# Patient Record
Sex: FEMALE | Race: WHITE | Employment: PART TIME | URBAN - NONMETROPOLITAN AREA
[De-identification: names, ages, dates, MRNs, and addresses within clinical notes are randomized per-mention and may not be internally consistent; named-entity substitution may affect disease eponyms.]

---

## 2019-03-25 ENCOUNTER — HOSPITAL ENCOUNTER (EMERGENCY)
Age: 24
Discharge: ANOTHER HOSPITAL | End: 2019-03-25
Payer: COMMERCIAL

## 2019-03-25 VITALS
BODY MASS INDEX: 28.41 KG/M2 | WEIGHT: 176 LBS | HEART RATE: 102 BPM | TEMPERATURE: 98.4 F | DIASTOLIC BLOOD PRESSURE: 67 MMHG | OXYGEN SATURATION: 96 % | SYSTOLIC BLOOD PRESSURE: 108 MMHG | RESPIRATION RATE: 16 BRPM

## 2019-03-25 DIAGNOSIS — S76.212A GROIN STRAIN, LEFT, INITIAL ENCOUNTER: ICD-10-CM

## 2019-03-25 DIAGNOSIS — R56.9 SEIZURE (HCC): Primary | ICD-10-CM

## 2019-03-25 LAB — GLUCOSE BLD-MCNC: 110 MG/DL (ref 70–108)

## 2019-03-25 PROCEDURE — 36000 PLACE NEEDLE IN VEIN: CPT

## 2019-03-25 PROCEDURE — 99215 OFFICE O/P EST HI 40 MIN: CPT

## 2019-03-25 PROCEDURE — 99203 OFFICE O/P NEW LOW 30 MIN: CPT | Performed by: NURSE PRACTITIONER

## 2019-03-25 PROCEDURE — 2709999900 HC NON-CHARGEABLE SUPPLY

## 2019-03-25 PROCEDURE — 82948 REAGENT STRIP/BLOOD GLUCOSE: CPT

## 2019-03-25 RX ORDER — OXCARBAZEPINE 300 MG/1
300 TABLET, FILM COATED ORAL 2 TIMES DAILY
COMMUNITY

## 2019-03-25 RX ORDER — IBUPROFEN 200 MG
200 TABLET ORAL EVERY 6 HOURS PRN
COMMUNITY

## 2019-03-25 ASSESSMENT — ENCOUNTER SYMPTOMS
RHINORRHEA: 0
SINUS PRESSURE: 0
PHOTOPHOBIA: 0
VOICE CHANGE: 0
ABDOMINAL PAIN: 0
SORE THROAT: 0
CHOKING: 0
STRIDOR: 0
VOMITING: 0
CHEST TIGHTNESS: 0
COUGH: 0
WHEEZING: 0
SINUS PAIN: 0
SHORTNESS OF BREATH: 0
NAUSEA: 1
TROUBLE SWALLOWING: 0

## 2019-03-25 ASSESSMENT — PAIN DESCRIPTION - PAIN TYPE: TYPE: ACUTE PAIN

## 2019-03-25 ASSESSMENT — PAIN DESCRIPTION - DESCRIPTORS: DESCRIPTORS: ACHING

## 2019-03-25 ASSESSMENT — PAIN SCALES - GENERAL: PAINLEVEL_OUTOF10: 6

## 2019-03-25 ASSESSMENT — PAIN DESCRIPTION - ONSET: ONSET: SUDDEN

## 2019-03-25 ASSESSMENT — PAIN DESCRIPTION - PROGRESSION: CLINICAL_PROGRESSION: NOT CHANGED

## 2019-03-25 ASSESSMENT — PAIN DESCRIPTION - FREQUENCY: FREQUENCY: CONTINUOUS

## 2019-03-25 ASSESSMENT — PAIN DESCRIPTION - ORIENTATION: ORIENTATION: LEFT

## 2019-03-25 ASSESSMENT — PAIN - FUNCTIONAL ASSESSMENT: PAIN_FUNCTIONAL_ASSESSMENT: PREVENTS OR INTERFERES SOME ACTIVE ACTIVITIES AND ADLS

## 2019-03-25 ASSESSMENT — PAIN DESCRIPTION - LOCATION: LOCATION: GROIN

## 2020-07-31 ENCOUNTER — HOSPITAL ENCOUNTER (EMERGENCY)
Age: 25
Discharge: HOME | End: 2020-07-31
Payer: COMMERCIAL

## 2020-07-31 VITALS
BODY MASS INDEX: 25.55 KG/M2 | WEIGHT: 159 LBS | RESPIRATION RATE: 16 BRPM | HEART RATE: 85 BPM | DIASTOLIC BLOOD PRESSURE: 71 MMHG | TEMPERATURE: 97.4 F | OXYGEN SATURATION: 97 % | HEIGHT: 66 IN | SYSTOLIC BLOOD PRESSURE: 136 MMHG

## 2020-07-31 LAB
GROUP A STREP CULTURE, REFLEX: NEGATIVE
REFLEX THROAT C + S: NORMAL

## 2020-07-31 PROCEDURE — 87880 STREP A ASSAY W/OPTIC: CPT

## 2020-07-31 PROCEDURE — 99213 OFFICE O/P EST LOW 20 MIN: CPT

## 2020-07-31 PROCEDURE — 99213 OFFICE O/P EST LOW 20 MIN: CPT | Performed by: NURSE PRACTITIONER

## 2020-07-31 PROCEDURE — 87070 CULTURE OTHR SPECIMN AEROBIC: CPT

## 2020-07-31 RX ORDER — CEFDINIR 300 MG/1
300 CAPSULE ORAL 2 TIMES DAILY
Qty: 20 CAPSULE | Refills: 0 | Status: SHIPPED | OUTPATIENT
Start: 2020-07-31 | End: 2020-08-10

## 2020-07-31 ASSESSMENT — PAIN DESCRIPTION - LOCATION: LOCATION: THROAT

## 2020-07-31 ASSESSMENT — PAIN - FUNCTIONAL ASSESSMENT: PAIN_FUNCTIONAL_ASSESSMENT: PREVENTS OR INTERFERES SOME ACTIVE ACTIVITIES AND ADLS

## 2020-07-31 ASSESSMENT — PAIN DESCRIPTION - PAIN TYPE: TYPE: ACUTE PAIN

## 2020-07-31 ASSESSMENT — ENCOUNTER SYMPTOMS
NAUSEA: 0
SHORTNESS OF BREATH: 0
VOMITING: 0
SORE THROAT: 1
COUGH: 0

## 2020-07-31 ASSESSMENT — PAIN DESCRIPTION - DESCRIPTORS: DESCRIPTORS: DISCOMFORT

## 2020-07-31 ASSESSMENT — PAIN SCALES - GENERAL: PAINLEVEL_OUTOF10: 8

## 2020-07-31 NOTE — ED TRIAGE NOTES
Patient ambulated to room with complaint of sore throat that radiates to ears.  States pain started 3 days ago and rates pain 8/10

## 2020-07-31 NOTE — ED PROVIDER NOTES
[facility]  Urgent Care Encounter       CHIEF COMPLAINT       Chief Complaint   Patient presents with    Pharyngitis       Nurses Notes reviewed and I agree except as noted in the HPI. HISTORY OF PRESENT ILLNESS   Jo is a 30 y.o. female who presents for evaluation of sore throat that has been ongoing for the past 3 days. Patient states that the pain is now beginning to radiate into her ears. She denies any fever or chills that she is aware of. She states that she has been taking Tylenol at home without much relief. She denies any cough, congestion, runny nose or any other upper respiratory complaints. She denies any known sick exposures. The history is provided by the patient. REVIEW OF SYSTEMS     Review of Systems   Constitutional: Negative for chills and fever. HENT: Positive for ear pain and sore throat. Negative for congestion. Respiratory: Negative for cough and shortness of breath. Cardiovascular: Negative for chest pain. Gastrointestinal: Negative for nausea and vomiting. Musculoskeletal: Negative for arthralgias and myalgias. Skin: Negative for rash. Allergic/Immunologic: Negative for immunocompromised state. Neurological: Negative for headaches. PAST MEDICAL HISTORY         Diagnosis Date    Seizure disorder [facility])     epilepsy       SURGICALHISTORY     Patient  has no past surgical history on file. CURRENT MEDICATIONS       Previous Medications    IBUPROFEN (ADVIL;MOTRIN) 200 MG TABLET    Take 200 mg by mouth every 6 hours as needed for Pain    OXCARBAZEPINE (TRILEPTAL) 300 MG TABLET    Take 300 mg by mouth 2 times daily    PSEUDOEPHEDRINE-APAP-DM (DAYQUIL PO)    Take by mouth       ALLERGIES     Patient is is allergic to pcn [penicillins]. Patients   There is no immunization history on file for this patient. FAMILY HISTORY     Patient's family history is not on file.     SOCIAL HISTORY     Patient  reports that she DIAGNOSTIC RESULTS     Labs:No results found for this visit on 07/31/20. IMAGING:    No orders to display         EKG:  none    URGENT CARE COURSE:     Vitals:    07/31/20 1650   BP: 136/71   Pulse: 85   Resp: 16   Temp: 97.4 °F (36.3 °C)   TempSrc: Temporal   SpO2: 97%   Weight: 159 lb (72.1 kg)   Height: 5' 6\" (1.676 m)       Medications - No data to display         PROCEDURES:  None    FINAL IMPRESSION      1. Exudative tonsillitis          DISPOSITION/ PLAN       Strep swab is negative at this time, however based on physical exam findings we will plan to treat with oral Omnicef and the patient is advised to continue to remain hydrated and use over-the-counter Tylenol and ibuprofen as needed. She is also given a prescription for Magic mouthwash for pain relief and is advised to follow-up on an outpatient basis if her symptoms do not improve or seem to worsen. She is agreeable to plan as discussed. PATIENT REFERRED TO:  No primary care provider on file. No primary physician on file.       DISCHARGE MEDICATIONS:  New Prescriptions    CEFDINIR (OMNICEF) 300 MG CAPSULE    Take 1 capsule by mouth 2 times daily for 10 days    MAGIC MOUTHWASH (MIRACLE MOUTHWASH)    Swish and spit 5 mLs 4 times daily as needed for Irritation       Discontinued Medications    No medications on file       Current Discharge Medication List          OSIEL Sigala CNP    (Please note that portions of this note were completed with a voice recognition program. Efforts were made to edit the dictations but occasionally words are mis-transcribed.)          OSIEL Sigala CNP  07/31/20 7848

## 2020-07-31 NOTE — ED NOTES
All discharge instructions and medications reviewed with pt at discharge. Instructed pt to go directly to main er if experiencing shortness of breath, chest pain, abdominal pain or if symptoms worsen. Pt verbalizes understanding. Ambulatory to lobby in stable condition.       Buddy Dale RN  07/31/20 0942

## 2020-08-02 LAB — THROAT/NOSE CULTURE: NORMAL

## 2023-09-16 ENCOUNTER — HOSPITAL ENCOUNTER (EMERGENCY)
Age: 28
Discharge: HOME | End: 2023-09-16

## 2023-09-16 VITALS
DIASTOLIC BLOOD PRESSURE: 87 MMHG | WEIGHT: 160 LBS | HEART RATE: 81 BPM | SYSTOLIC BLOOD PRESSURE: 118 MMHG | RESPIRATION RATE: 22 BRPM | TEMPERATURE: 98.6 F | BODY MASS INDEX: 25.71 KG/M2 | OXYGEN SATURATION: 99 % | HEIGHT: 66 IN

## 2023-09-16 DIAGNOSIS — K04.01 ACUTE PULPITIS: Primary | ICD-10-CM

## 2023-09-16 PROCEDURE — 99284 EMERGENCY DEPT VISIT MOD MDM: CPT

## 2023-09-16 PROCEDURE — 6360000002 HC RX W HCPCS: Performed by: NURSE PRACTITIONER

## 2023-09-16 PROCEDURE — 96372 THER/PROPH/DIAG INJ SC/IM: CPT

## 2023-09-16 PROCEDURE — 6370000000 HC RX 637 (ALT 250 FOR IP): Performed by: NURSE PRACTITIONER

## 2023-09-16 RX ORDER — CLINDAMYCIN HYDROCHLORIDE 150 MG/1
300 CAPSULE ORAL ONCE
Status: COMPLETED | OUTPATIENT
Start: 2023-09-16 | End: 2023-09-16

## 2023-09-16 RX ORDER — CLINDAMYCIN HYDROCHLORIDE 300 MG/1
300 CAPSULE ORAL 4 TIMES DAILY
Qty: 28 CAPSULE | Refills: 0 | Status: SHIPPED | OUTPATIENT
Start: 2023-09-16 | End: 2023-09-23

## 2023-09-16 RX ORDER — KETOROLAC TROMETHAMINE 30 MG/ML
30 INJECTION, SOLUTION INTRAMUSCULAR; INTRAVENOUS ONCE
Status: COMPLETED | OUTPATIENT
Start: 2023-09-16 | End: 2023-09-16

## 2023-09-16 RX ORDER — NAPROXEN 500 MG/1
500 TABLET ORAL 2 TIMES DAILY WITH MEALS
Qty: 30 TABLET | Refills: 0 | Status: SHIPPED | OUTPATIENT
Start: 2023-09-16 | End: 2023-10-01

## 2023-09-16 RX ADMIN — CLINDAMYCIN HYDROCHLORIDE 300 MG: 150 CAPSULE ORAL at 07:00

## 2023-09-16 RX ADMIN — Medication 5 ML: at 07:00

## 2023-09-16 RX ADMIN — KETOROLAC TROMETHAMINE 30 MG: 30 INJECTION, SOLUTION INTRAMUSCULAR at 07:00

## 2023-09-16 ASSESSMENT — PAIN - FUNCTIONAL ASSESSMENT: PAIN_FUNCTIONAL_ASSESSMENT: 0-10

## 2023-09-16 ASSESSMENT — PAIN SCALES - GENERAL: PAINLEVEL_OUTOF10: 9

## 2023-09-16 ASSESSMENT — PAIN DESCRIPTION - ONSET: ONSET: ON-GOING

## 2023-09-16 ASSESSMENT — PAIN DESCRIPTION - ORIENTATION: ORIENTATION: LEFT

## 2023-09-16 ASSESSMENT — PAIN DESCRIPTION - FREQUENCY: FREQUENCY: CONTINUOUS

## 2023-09-16 ASSESSMENT — PAIN DESCRIPTION - LOCATION: LOCATION: TEETH

## 2023-09-16 NOTE — ED TRIAGE NOTES
Pt presents to the ED with c/o dental pain. Pt friend states that the pt has tried tylenol, ibuprofen and Orajel and nothing has touched the pain. VSS.

## 2024-03-06 ENCOUNTER — APPOINTMENT (OUTPATIENT)
Dept: GENERAL RADIOLOGY | Age: 29
End: 2024-03-06
Payer: COMMERCIAL

## 2024-03-06 ENCOUNTER — HOSPITAL ENCOUNTER (EMERGENCY)
Age: 29
Discharge: HOME | End: 2024-03-06
Payer: COMMERCIAL

## 2024-03-06 VITALS
SYSTOLIC BLOOD PRESSURE: 116 MMHG | RESPIRATION RATE: 17 BRPM | TEMPERATURE: 98.1 F | BODY MASS INDEX: 26.52 KG/M2 | OXYGEN SATURATION: 98 % | DIASTOLIC BLOOD PRESSURE: 78 MMHG | WEIGHT: 165 LBS | HEART RATE: 82 BPM | HEIGHT: 66 IN

## 2024-03-06 DIAGNOSIS — M53.3 COCCYDYNIA: Primary | ICD-10-CM

## 2024-03-06 PROCEDURE — 99283 EMERGENCY DEPT VISIT LOW MDM: CPT

## 2024-03-06 PROCEDURE — 72220 X-RAY EXAM SACRUM TAILBONE: CPT

## 2024-03-06 PROCEDURE — 6370000000 HC RX 637 (ALT 250 FOR IP): Performed by: PHYSICIAN ASSISTANT

## 2024-03-06 RX ORDER — NAPROXEN 250 MG/1
500 TABLET ORAL ONCE
Status: COMPLETED | OUTPATIENT
Start: 2024-03-06 | End: 2024-03-06

## 2024-03-06 RX ORDER — DIAZEPAM 5 MG/1
5 TABLET ORAL ONCE
Status: COMPLETED | OUTPATIENT
Start: 2024-03-06 | End: 2024-03-06

## 2024-03-06 RX ADMIN — NAPROXEN 500 MG: 250 TABLET ORAL at 12:55

## 2024-03-06 RX ADMIN — DIAZEPAM 5 MG: 5 TABLET ORAL at 12:55

## 2024-03-06 ASSESSMENT — PAIN - FUNCTIONAL ASSESSMENT: PAIN_FUNCTIONAL_ASSESSMENT: 0-10

## 2024-03-06 ASSESSMENT — PAIN DESCRIPTION - LOCATION: LOCATION: BUTTOCKS

## 2024-03-06 ASSESSMENT — PAIN SCALES - GENERAL: PAINLEVEL_OUTOF10: 3

## 2024-03-06 NOTE — ED PROVIDER NOTES
[facility] EMERGENCY DEPT  EMERGENCY DEPARTMENT ENCOUNTER      Pt Name: Rubina Woo  MRN: [id number]  Birthdate 1995  Date of evaluation: 3/6/2024  Provider: Ej Riddle PA-C    CHIEF COMPLAINT     Chief Complaint   Patient presents with    Tailbone Pain       HISTORY OF PRESENT ILLNESS    Rubina Woo is a 28 y.o. female who presents to the emergency department complaints of tailbone pain.  Patient states is been present for approximately 1 week.  She thinks that she may have sat down hard on her couch was a metal bar on the couch.  She has been having pain ever since.  She denies any fevers or chills.  Denies hematuria dysuria.  Denies pregnancy.  Patient states last menstrual cycle was a couple weeks ago.  Patient denies any fevers or chills.  Says it hurts worse when she sits on it.  Denies any difficulty with bowel movements.  Denies electrolyte stools      Triage notes and Nursing notes were reviewed by myself.  Any discrepancies are addressed above.    PAST MEDICAL HISTORY     Past Medical History:   Diagnosis Date    Seizure disorder (HCC)     epilepsy       SURGICAL HISTORY     No past surgical history on file.    CURRENT MEDICATIONS       Previous Medications    IBUPROFEN (ADVIL;MOTRIN) 200 MG TABLET    Take 200 mg by mouth every 6 hours as needed for Pain    MAGIC MOUTHWASH (MIRACLE MOUTHWASH)    Swish and spit 5 mLs 4 times daily as needed for Irritation 1:1:1, lidocaine, diphenhydramine, maalox    OXCARBAZEPINE (TRILEPTAL) 300 MG TABLET    Take 300 mg by mouth 2 times daily    PSEUDOEPHEDRINE-APAP-DM (DAYQUIL PO)    Take by mouth       ALLERGIES     Pcn [penicillins]    FAMILY HISTORY     No family history on file.     SOCIAL HISTORY     Social History     Socioeconomic History    Marital status: Single   Tobacco Use    Smoking status: Every Day     Current packs/day: 1.00     Types: Cigarettes    Smokeless tobacco: Never   Vaping Use    Vaping Use: Never used   Substance and Sexual Activity

## 2024-03-06 NOTE — ED TRIAGE NOTES
Pt presents to the ER with c/o tailbone pain x1 week. Pt thinks she may have sat down too hard on her couch and landed on a metal bar. Pt denies pain meds at home. Pt is alert, VSS

## 2024-03-06 NOTE — DISCHARGE INSTRUCTIONS
a coccygeal donut cushion which will be helpful for pain control as well as healing   Spoke with pt, informed lab results and Dr. Ramirez's notes, pt agreeable, reminded to schedule Mammogram, pt verbalized understanding.

## 2024-04-26 ENCOUNTER — HOSPITAL ENCOUNTER (EMERGENCY)
Age: 29
Discharge: HOME | End: 2024-04-26
Payer: COMMERCIAL

## 2024-04-26 ENCOUNTER — APPOINTMENT (OUTPATIENT)
Dept: GENERAL RADIOLOGY | Age: 29
End: 2024-04-26
Payer: COMMERCIAL

## 2024-04-26 VITALS
HEART RATE: 81 BPM | HEIGHT: 66 IN | OXYGEN SATURATION: 98 % | BODY MASS INDEX: 27.16 KG/M2 | SYSTOLIC BLOOD PRESSURE: 113 MMHG | DIASTOLIC BLOOD PRESSURE: 67 MMHG | TEMPERATURE: 96.9 F | RESPIRATION RATE: 20 BRPM | WEIGHT: 169 LBS

## 2024-04-26 DIAGNOSIS — R06.00 DYSPNEA, UNSPECIFIED TYPE: ICD-10-CM

## 2024-04-26 DIAGNOSIS — Z87.898 HISTORY OF WHEEZING: Primary | ICD-10-CM

## 2024-04-26 DIAGNOSIS — Z71.6 TOBACCO ABUSE COUNSELING: ICD-10-CM

## 2024-04-26 PROCEDURE — 94640 AIRWAY INHALATION TREATMENT: CPT

## 2024-04-26 PROCEDURE — 99213 OFFICE O/P EST LOW 20 MIN: CPT

## 2024-04-26 PROCEDURE — 6370000000 HC RX 637 (ALT 250 FOR IP)

## 2024-04-26 PROCEDURE — 71046 X-RAY EXAM CHEST 2 VIEWS: CPT

## 2024-04-26 RX ORDER — IPRATROPIUM BROMIDE AND ALBUTEROL SULFATE 2.5; .5 MG/3ML; MG/3ML
1 SOLUTION RESPIRATORY (INHALATION) ONCE
Status: COMPLETED | OUTPATIENT
Start: 2024-04-26 | End: 2024-04-26

## 2024-04-26 RX ORDER — IPRATROPIUM BROMIDE AND ALBUTEROL SULFATE 2.5; .5 MG/3ML; MG/3ML
1 SOLUTION RESPIRATORY (INHALATION)
Status: DISCONTINUED | OUTPATIENT
Start: 2024-04-26 | End: 2024-04-26

## 2024-04-26 RX ORDER — ALBUTEROL SULFATE 2.5 MG/3ML
2.5 SOLUTION RESPIRATORY (INHALATION) EVERY 4 HOURS PRN
Qty: 120 EACH | Refills: 0 | Status: SHIPPED | OUTPATIENT
Start: 2024-04-26

## 2024-04-26 RX ORDER — PREDNISONE 10 MG/1
TABLET ORAL
Qty: 20 TABLET | Refills: 0 | Status: SHIPPED | OUTPATIENT
Start: 2024-04-26 | End: 2024-05-06

## 2024-04-26 RX ORDER — ALBUTEROL SULFATE 90 UG/1
2 AEROSOL, METERED RESPIRATORY (INHALATION) 4 TIMES DAILY PRN
Qty: 18 G | Refills: 0 | Status: SHIPPED | OUTPATIENT
Start: 2024-04-26

## 2024-04-26 RX ADMIN — IPRATROPIUM BROMIDE AND ALBUTEROL SULFATE 1 DOSE: .5; 3 SOLUTION RESPIRATORY (INHALATION) at 17:19

## 2024-04-26 ASSESSMENT — PAIN - FUNCTIONAL ASSESSMENT: PAIN_FUNCTIONAL_ASSESSMENT: NONE - DENIES PAIN

## 2024-04-26 NOTE — DISCHARGE INSTRUCTIONS
Please use albuterol HFA inhaler if you experience shortness of breath/wheezing again.  If that is ineffective please take albuterol nebulizer treatment as prescribed.  If you are not getting satisfactory relief please attend ER.  If you have chills or chest pain please attend ER.  If you have any other urgent/emergent medical concerns please go to ER.    Please make an appointment with your family provider, you are very healthy young individual and it would behoove you to undergo smoking cessation.  If you are interested there are many aids that can help you quit smoking.  You can talk to your family doctor or outpatient provider which is someone you can follow-up with and have ongoing relationship to see what works or not.    Please take prednisone 40 mg for 5 days.  This will help decrease inflammation in your lungs and age and breathing.    I hope you are feeling better soon!

## 2024-04-26 NOTE — ED NOTES
Ashlee Meredith Np notified patient has labored breathing and diminished lung sounds. Order for breathing treatment received. Unable to pull medication from [facility] due to due start time. New order for medication placed by Thomas Wilks NP. Nebulizer medication given to patient with mouthpiece. Tolerated well     Babar Morfin, RN  04/26/24 0712

## 2024-04-26 NOTE — ED PROVIDER NOTES
[facility] URGENT CARE      URGENT CARE     Pt Name: Rubina Woo  MRN: [id number]  Birthdate 1995  Date of evaluation: 4/26/2024  Provider: OSIEL Hastings CNP    Urgent Care Encounter     CHIEF COMPLAINT       Chief Complaint   Patient presents with    Cough    Shortness of Breath     HISTORY OF PRESENT ILLNESS   Rubina Woo is a 28 y.o. female who presents to urgent care with chief complaint of cough/shortness of breath.  Started 2 days ago.  Denies history of the symptoms.  Admits to smoking 1 pack every 2-3 days in addition to vaping.  Denies recent travel, history of clots, calf tenderness or recent surgery.  Denies trauma.  Denies history of asthma.  Denies treatments.  Denies fevers.  Denies sick contacts with similar symptoms.    History obtained from patient  URGENT CARE TIMELINE      ED Course as of 04/26/24 1804   Fri Apr 26, 2024   1726 Respirations: 24 [JR]   1726 SpO2: 94 %  Marginal hypoxia.  Vitals are stable otherwise [JR]   1735 Following DuoNeb patient states she had a very temporary relief but unfortunately her symptoms are recurring after breathing treatment. [JR]   1735 Discussed with nursing staff need to recheck vitals after she returns from chest x-ray [JR]   1744 BP: 113/67  Significant improvement via vitals.  Reevaluation of lung sounds reveal significant improvement [JR]   1754 XR CHEST (2 VW)  Normal chest x-ray.  No pneumothorax, consolidation of lobes or structural abnormality within the thorax [JR]   1803 Again, reevaluation per myself reveals ongoing improvement.  Marginal wheezes left.  Patient admits to complete resolution of shortness of breath.  SpO2 is greater than 98 on room air. [JR]      ED Course User Index  [JR] Wood Wilks APRN - CNP     PAST MEDICAL HISTORY         Diagnosis Date    Seizure disorder (HCC)     epilepsy     SURGICALHISTORY     Patient  has no past surgical history on file.  CURRENT MEDICATIONS       Previous

## 2024-04-26 NOTE — ED TRIAGE NOTES
Patient taken to room 2 from 's desk with complaint of shortness of breath. Patient states she has had a cough for past 2 days and has been feeling increasingly short of breath.

## 2024-05-07 ENCOUNTER — HOSPITAL ENCOUNTER (EMERGENCY)
Age: 29
Discharge: HOME | End: 2024-05-07
Payer: COMMERCIAL

## 2024-05-07 VITALS
WEIGHT: 169 LBS | SYSTOLIC BLOOD PRESSURE: 128 MMHG | DIASTOLIC BLOOD PRESSURE: 88 MMHG | RESPIRATION RATE: 20 BRPM | OXYGEN SATURATION: 100 % | TEMPERATURE: 98.1 F | HEART RATE: 79 BPM | BODY MASS INDEX: 27.28 KG/M2

## 2024-05-07 DIAGNOSIS — S16.1XXA STRAIN OF NECK MUSCLE, INITIAL ENCOUNTER: Primary | ICD-10-CM

## 2024-05-07 PROCEDURE — 99213 OFFICE O/P EST LOW 20 MIN: CPT

## 2024-05-07 RX ORDER — KETOROLAC TROMETHAMINE 10 MG/1
10 TABLET, FILM COATED ORAL EVERY 6 HOURS PRN
Qty: 20 TABLET | Refills: 0 | Status: SHIPPED | OUTPATIENT
Start: 2024-05-07

## 2024-05-07 RX ORDER — METHOCARBAMOL 750 MG/1
750 TABLET, FILM COATED ORAL 4 TIMES DAILY
Qty: 40 TABLET | Refills: 0 | Status: SHIPPED | OUTPATIENT
Start: 2024-05-07 | End: 2024-05-17

## 2024-05-07 RX ORDER — LIDOCAINE 4 G/G
1 PATCH TOPICAL DAILY
Qty: 30 PATCH | Refills: 0 | Status: SHIPPED | OUTPATIENT
Start: 2024-05-07 | End: 2024-06-06

## 2024-05-07 RX ORDER — ACETAMINOPHEN 500 MG
500 TABLET ORAL 4 TIMES DAILY PRN
Qty: 120 TABLET | Refills: 0 | Status: SHIPPED | OUTPATIENT
Start: 2024-05-07

## 2024-05-07 ASSESSMENT — PAIN DESCRIPTION - PAIN TYPE: TYPE: ACUTE PAIN

## 2024-05-07 ASSESSMENT — PAIN DESCRIPTION - FREQUENCY: FREQUENCY: CONTINUOUS

## 2024-05-07 ASSESSMENT — PAIN - FUNCTIONAL ASSESSMENT
PAIN_FUNCTIONAL_ASSESSMENT: 0-10
PAIN_FUNCTIONAL_ASSESSMENT: PREVENTS OR INTERFERES SOME ACTIVE ACTIVITIES AND ADLS

## 2024-05-07 ASSESSMENT — PAIN DESCRIPTION - DESCRIPTORS: DESCRIPTORS: SHARP;ACHING

## 2024-05-07 ASSESSMENT — PAIN DESCRIPTION - ORIENTATION: ORIENTATION: LEFT

## 2024-05-07 ASSESSMENT — PAIN SCALES - GENERAL: PAINLEVEL_OUTOF10: 7

## 2024-05-07 ASSESSMENT — PAIN DESCRIPTION - LOCATION: LOCATION: NECK

## 2024-05-07 NOTE — ED PROVIDER NOTES
[facility] URGENT CARE      URGENT CARE     Pt Name: Rubina Woo  MRN: [id number]  Birthdate 1995  Date of evaluation: 5/7/2024  Provider: OSIEL Hastings CNP    Urgent Care Encounter     CHIEF COMPLAINT       Chief Complaint   Patient presents with    Neck Pain     Radiating down left arm and back     HISTORY OF PRESENT ILLNESS   Rubina Woo is a 28 y.o. female who presents to urgent care with chief complaint of left neck/shoulder and arm pain.  Started about a week ago.  No treatments tried.  Denies loss of sensation/numbness.  Denies injury or surgery to this area before.  Sudden onset when she was getting in the car.  Difficulty with rotating head in either direction where it elicits sharp/severe pain..  Denies trauma fall, car accident or any known contributing event.    Denies fever or chills.  Denies nausea vomiting.  Denies chest pains or shortness of breath.  Pain is replicable and not exertional.  Denies history of heart condition or familial history of heart attack at young age.    History obtained from patient  URGENT CARE TIMELINE      ED Course as of 05/07/24 0839   Tue May 07, 2024   0820 BP: 128/88  Vital signs are stable, afebrile. [JR]   0839 Most likely differential is musculoskeletal strain.  This is because the replica ability via pain and repetitive motions at her current job.  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  Considering there is no trauma this is unlikely a skeletal issue such as deformity/malalignment.  Additionally there is no crepitus or bony instability/bony tenderness on evaluation.  It is soft tissue pain. [JR]      ED Course User Index  [JR] Wood Wilks APRN - CNP     PAST MEDICAL HISTORY         Diagnosis Date    Seizure disorder (HCC)     epilepsy     SURGICALHISTORY     Patient  has no past surgical history on file.  CURRENT MEDICATIONS       Previous Medications    ALBUTEROL  (PROVENTIL) (2.5 MG/3ML) 0.083% NEBULIZER SOLUTION    Take 3 mLs by nebulization every 4 hours as needed for Wheezing    ALBUTEROL SULFATE HFA (VENTOLIN HFA) 108 (90 BASE) MCG/ACT INHALER    Inhale 2 puffs into the lungs 4 times daily as needed for Wheezing     ALLERGIES     Patient is is allergic to pcn [penicillins].  Patients   There is no immunization history on file for this patient.  FAMILY HISTORY     Patient's family history is not on file.  SOCIAL HISTORY     Patient  reports that she has been smoking cigarettes. She has never used smokeless tobacco. She reports current alcohol use. She reports that she does not use drugs.  PHYSICAL EXAM     ED TRIAGE VITALS  BP: 128/88, Temp: 98.1 °F (36.7 °C), Pulse: 79, Respirations: 20, SpO2: 100 %,Estimated body mass index is 27.28 kg/m² as calculated from the following:    Height as of 4/26/24: 1.676 m (5' 6\").    Weight as of this encounter: 76.7 kg (169 lb).,Patient's last menstrual period was 04/22/2024.  Physical Exam  Vitals and nursing note reviewed.   Constitutional:       General: She is not in acute distress.     Appearance: Normal appearance. She is not ill-appearing, toxic-appearing or diaphoretic.   Musculoskeletal:         General: Tenderness present. No swelling, deformity or signs of injury.      Right shoulder: Normal.      Left shoulder: Tenderness present. No swelling, deformity, effusion, laceration, bony tenderness or crepitus. Decreased range of motion. Normal strength. Normal pulse.        Arms:       Comments: Tenderness just anterior to the left deltoid and the neck.  No visual manifestation of injury such as bruising, deformity swelling or erythema.  CMS is intact down entire left arm.  Pain is replicable, she is holding her arm in neutral position over her abdomen.  Passive range of motion elicits much less pain indicating a likelihood of muscular strain.   Skin:     Capillary Refill: Capillary refill takes less than 2 seconds.

## 2024-05-07 NOTE — DISCHARGE INSTRUCTIONS
Please treat pain with Toradol.  If pain remains out of control please use methocarbamol in addition to Toradol as prescribed.  This is a skeletal muscle relaxant and may cause drowsiness.  Do not drive cars/operate machinery or drink alcohol with methocarbamol.  Lidocaine 4% patches okay to use for 12 hours on/12 hours off.  If you prefer to use them at night that is fine.  If pain remains out of control you can add 500 mg of Tylenol as prescribed.    If you have fevers or numbness down arm please attend ER/urgent care for reevaluation.    Please adhere to rest, heat to help aid in resolving your acute left neck/shoulder pain.    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

## 2024-05-07 NOTE — ED TRIAGE NOTES
Patient ambulated to room with c/o left sided neck pain, radiating to left lower arm and left, upper back beginning one week ago. Denies injury. Pain increased with neck movement.

## 2024-10-02 ENCOUNTER — HOSPITAL ENCOUNTER (EMERGENCY)
Age: 29
Discharge: HOME | End: 2024-10-02
Payer: COMMERCIAL

## 2024-10-02 VITALS
SYSTOLIC BLOOD PRESSURE: 96 MMHG | DIASTOLIC BLOOD PRESSURE: 71 MMHG | TEMPERATURE: 97.6 F | RESPIRATION RATE: 20 BRPM | HEART RATE: 78 BPM | OXYGEN SATURATION: 99 %

## 2024-10-02 DIAGNOSIS — J00 ACUTE RHINITIS: Primary | ICD-10-CM

## 2024-10-02 DIAGNOSIS — J02.9 VIRAL PHARYNGITIS: ICD-10-CM

## 2024-10-02 LAB — S PYO AG THROAT QL: NEGATIVE

## 2024-10-02 PROCEDURE — 87651 STREP A DNA AMP PROBE: CPT

## 2024-10-02 PROCEDURE — 99213 OFFICE O/P EST LOW 20 MIN: CPT

## 2024-10-02 PROCEDURE — 99212 OFFICE O/P EST SF 10 MIN: CPT | Performed by: EMERGENCY MEDICINE

## 2024-10-02 ASSESSMENT — ENCOUNTER SYMPTOMS
RHINORRHEA: 1
WHEEZING: 0
SORE THROAT: 1
COUGH: 1
SHORTNESS OF BREATH: 0

## 2024-10-02 NOTE — ED PROVIDER NOTES
[facility] URGENT CARE  Urgent Care Encounter       CHIEF COMPLAINT       Chief Complaint   Patient presents with    Congestion    Nasal Congestion       Nurses Notes reviewed and I agree except as noted in the HPI.  HISTORY OF PRESENT ILLNESS   Rubina Woo is a 28 y.o. female who presents for congestion, postnasal drip, sore throat and cough.  No known fever.  Symptoms have been present for 3 to 4 days.  Patient took sinus medication this morning.  Patient works night shift and states she does not feel able to go in tonight.  Rates her sore throat an 8 on a 10 scale.  No difficulty swallowing.  No voice change.    HPI    REVIEW OF SYSTEMS     Review of Systems   Constitutional:  Negative for activity change, fatigue and fever.   HENT:  Positive for congestion, rhinorrhea and sore throat.    Respiratory:  Positive for cough. Negative for shortness of breath and wheezing.    Cardiovascular:  Negative for chest pain.       PAST MEDICAL HISTORY         Diagnosis Date    Seizure disorder (HCC)     epilepsy       SURGICALHISTORY     Patient  has no past surgical history on file.    CURRENT MEDICATIONS       Previous Medications    ACETAMINOPHEN (TYLENOL) 500 MG TABLET    Take 1 tablet by mouth 4 times daily as needed for Pain    ALBUTEROL (PROVENTIL) (2.5 MG/3ML) 0.083% NEBULIZER SOLUTION    Take 3 mLs by nebulization every 4 hours as needed for Wheezing    ALBUTEROL SULFATE HFA (VENTOLIN HFA) 108 (90 BASE) MCG/ACT INHALER    Inhale 2 puffs into the lungs 4 times daily as needed for Wheezing    KETOROLAC (TORADOL) 10 MG TABLET    Take 1 tablet by mouth every 6 hours as needed for Pain       ALLERGIES     Patient is is allergic to pcn [penicillins].    Patients   There is no immunization history on file for this patient.    FAMILY HISTORY     Patient's family history is not on file.    SOCIAL HISTORY     Patient  reports that she has been smoking cigarettes. She has never used smokeless tobacco. She  allergy related.  Patient is advised to use Claritin-D to help with her symptoms.  Over-the-counter medications for sore throat.  Follow-up family physician or return here if symptoms do not resolve in additional 5 days.  Sooner if worse      PATIENT REFERRED TO:  No primary care provider on file.  No primary physician on file.      DISCHARGE MEDICATIONS:  New Prescriptions    No medications on file       Discontinued Medications    No medications on file       Current Discharge Medication List          OSIEL Mcneil - CNP    (Please note that portions of this note were completed with a voice recognition program. Efforts were made to edit the dictations but occasionally words are mis-transcribed.)           Jimmy Vasquez, APRN - CNP  10/02/24 [id number]

## 2024-10-02 NOTE — DISCHARGE INSTRUCTIONS
Use Claritin-D to help with runny nose and postnasal drip symptoms.  Salt water gargles, Chloraseptic spray or lozenges, Tylenol/ibuprofen, slushy drinks for sore throat    Follow-up family physician or return here if no significant improvement in additional 4 to 5 days.  Return sooner for new or worsening symptoms.